# Patient Record
Sex: FEMALE | Race: WHITE | URBAN - METROPOLITAN AREA
[De-identification: names, ages, dates, MRNs, and addresses within clinical notes are randomized per-mention and may not be internally consistent; named-entity substitution may affect disease eponyms.]

---

## 2017-10-02 NOTE — PATIENT DISCUSSION
offered option of LVC OU to yield increased VA without glasses.  Ed if happy with wearing DVO and dist and NVA through those glasses I would recommend to continue with that if he does not want additional surgery.

## 2020-01-23 NOTE — PATIENT DISCUSSION
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  LOW risk at this time but watch yearly for changes in Rory [address] 74.

## 2020-01-23 NOTE — PATIENT DISCUSSION
Recommended observation.  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

## 2021-02-17 NOTE — PATIENT DISCUSSION
2/17/2021: slight change in Rory [address] 74 cupping/photos for baseline to observe over time.  get HVF update in 4-5 months watch for sup VF changes and possible increased PBS (based on ONHs).

## 2021-02-17 NOTE — PATIENT DISCUSSION
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  try Tony 4 STEFFANY and if helpful will grind in.

## 2021-07-16 ENCOUNTER — PREPPED CHART (OUTPATIENT)
Dept: URBAN - METROPOLITAN AREA CLINIC 46 | Facility: CLINIC | Age: 61
End: 2021-07-16

## 2021-07-21 NOTE — PATIENT DISCUSSION
2/17/2021: slight change in Rory [address] 74 cupping/photos for baseline to observe over time.  get HVF update in 4-5 months watch for sup VF changes and possible increased PBS (based on ONHs).